# Patient Record
Sex: FEMALE | Race: WHITE | NOT HISPANIC OR LATINO | Employment: FULL TIME | URBAN - METROPOLITAN AREA
[De-identification: names, ages, dates, MRNs, and addresses within clinical notes are randomized per-mention and may not be internally consistent; named-entity substitution may affect disease eponyms.]

---

## 2023-08-20 PROBLEM — I49.3 PVC'S (PREMATURE VENTRICULAR CONTRACTIONS): Status: ACTIVE | Noted: 2023-08-20

## 2023-08-20 PROBLEM — R93.89 ABNORMAL MAGNETIC RESONANCE IMAGING STUDY: Status: ACTIVE | Noted: 2023-08-20

## 2023-08-20 PROBLEM — R55 SYNCOPE: Status: ACTIVE | Noted: 2023-08-20

## 2023-08-20 PROBLEM — R94.31 ABNORMAL EKG: Status: ACTIVE | Noted: 2023-08-20

## 2023-08-20 PROBLEM — E03.8 ADULT ONSET HYPOTHYROIDISM: Status: ACTIVE | Noted: 2023-08-20

## 2023-08-20 PROBLEM — R00.2 PALPITATIONS: Status: ACTIVE | Noted: 2023-08-20

## 2023-08-20 PROBLEM — R53.82 CHRONIC FATIGUE: Status: ACTIVE | Noted: 2023-08-20

## 2023-08-20 PROBLEM — M32.9 SYSTEMIC LUPUS ERYTHEMATOSUS (MULTI): Status: ACTIVE | Noted: 2023-08-20

## 2023-08-20 PROBLEM — I47.29 NSVT (NONSUSTAINED VENTRICULAR TACHYCARDIA) (MULTI): Status: ACTIVE | Noted: 2023-08-20

## 2023-08-20 PROBLEM — I45.10 COMPLETE RIGHT BUNDLE BRANCH BLOCK (RBBB): Status: ACTIVE | Noted: 2023-08-20

## 2023-08-20 RX ORDER — PARSLEY 450 MG
CAPSULE ORAL
COMMUNITY
Start: 2017-12-27

## 2023-08-20 RX ORDER — GLUCOSAM/CHONDRO/HERB 149/HYAL 750-100 MG
TABLET ORAL
COMMUNITY

## 2023-08-20 RX ORDER — THYROID 120 MG/1
1 TABLET ORAL DAILY
COMMUNITY

## 2023-08-20 RX ORDER — ONDANSETRON 4 MG/1
4 TABLET, ORALLY DISINTEGRATING ORAL
COMMUNITY
Start: 2022-05-22

## 2023-08-20 RX ORDER — SOTALOL HYDROCHLORIDE 80 MG/1
TABLET ORAL
COMMUNITY
Start: 2022-10-13

## 2023-08-20 RX ORDER — MELOXICAM 15 MG/1
15 TABLET ORAL ONCE AS NEEDED
COMMUNITY

## 2023-08-20 RX ORDER — MILK THISTLE 500 MG
CAPSULE ORAL
COMMUNITY

## 2023-08-20 RX ORDER — NALTREXONE HYDROCHLORIDE 50 MG/1
4.5 TABLET, FILM COATED ORAL DAILY
COMMUNITY

## 2023-08-20 RX ORDER — THYROID 30 MG/1
1 TABLET ORAL DAILY
COMMUNITY

## 2023-08-20 RX ORDER — ACETAMINOPHEN 500 MG
TABLET ORAL
COMMUNITY

## 2023-08-20 RX ORDER — LICORICE ROOT EXTRACT
POWDER (GRAM) MISCELLANEOUS
COMMUNITY

## 2023-08-20 RX ORDER — MULTIVITAMIN
TABLET ORAL
COMMUNITY
Start: 2017-09-26

## 2023-08-20 RX ORDER — METOPROLOL TARTRATE 50 MG/1
TABLET ORAL EVERY 12 HOURS
COMMUNITY
Start: 2022-05-20

## 2023-08-20 RX ORDER — LIDOCAINE 560 MG/1
PATCH PERCUTANEOUS; TOPICAL; TRANSDERMAL
COMMUNITY
Start: 2022-05-20

## 2023-08-20 RX ORDER — SOTALOL HYDROCHLORIDE 80 MG/1
40 TABLET ORAL 2 TIMES DAILY
COMMUNITY
Start: 2023-08-17

## 2023-10-01 PROBLEM — Z79.899 LONG-TERM USE OF HYDROXYCHLOROQUINE: Chronic | Status: ACTIVE | Noted: 2023-10-01

## 2023-10-01 NOTE — PROGRESS NOTES
Subjective   Patient ID: Destinee Talbot is a 38 y.o. female who presents for Lupus (Here for follow up lupus).  Pt feels awful.  Thinks it is more her cardiac issues.   She has scarring on her septal wall and may have sarcoidosis but they have been unable to determine for sure.  Pt had a lot of presyncope.   Now has loop recorder and no longer as any episodes of NS-SVT.  Pt with chest pain, dyspnea, fatigue.  No malar rash, has foot/ankle/hip pain but otherwise ok.   Doesn't think lupus is active.  Pt is able to be out in the sun without flare        Review of Systems   Constitutional:  Positive for fatigue. Negative for fever and unexpected weight change.   HENT:  Negative for congestion, mouth sores, postnasal drip, sore throat and tinnitus.         No dry eye and dry mouth   Eyes:  Negative for pain and redness.   Respiratory:  Negative for cough and shortness of breath.    Cardiovascular:  Positive for chest pain. Negative for palpitations and leg swelling.   Gastrointestinal:  Negative for abdominal pain, constipation, diarrhea, nausea and vomiting.   Endocrine: Negative for polyuria.   Genitourinary:  Negative for dysuria, flank pain and hematuria.   Musculoskeletal:  Positive for arthralgias. Negative for back pain and myalgias.   Skin:  Negative for color change.   Neurological:  Negative for dizziness, weakness, numbness and headaches.   Hematological:  Does not bruise/bleed easily.   Psychiatric/Behavioral:  Negative for sleep disturbance. The patient is not nervous/anxious.        Objective   Physical Exam  Vitals reviewed.   Constitutional:       General: She is not in acute distress.     Appearance: Normal appearance.   HENT:      Head: Normocephalic and atraumatic.      Nose: Nose normal.   Eyes:      Pupils: Pupils are equal, round, and reactive to light.   Cardiovascular:      Rate and Rhythm: Normal rate and regular rhythm.      Pulses: Normal pulses.      Heart sounds: No murmur heard.     No friction  rub. No gallop.   Pulmonary:      Breath sounds: Normal breath sounds. No wheezing or rales.   Musculoskeletal:         General: No swelling, tenderness or deformity.      Cervical back: Normal range of motion.      Right lower leg: No edema.      Left lower leg: No edema.   Skin:     Findings: No erythema or rash.   Neurological:      General: No focal deficit present.      Mental Status: She is alert.   Psychiatric:         Mood and Affect: Mood normal.         Assessment/Plan   Problem List Items Addressed This Visit             ICD-10-CM    NSVT (nonsustained ventricular tachycardia) (CMS/[facility]) I47.29    Systemic lupus erythematosus (CMS/[facility]) - Primary M32.9     Lupus but has stopped plaquenil therapy.   Pt stable and appears most of her symptoms may be due to her possible cardiac sarcoid.   Will check lab to further evaluate.  Vitamin D level ordered         Relevant Orders    Anti-DNA Antibody, Double-Stranded    C3 Complement    C4 Complement    CBC and Auto Differential    Comprehensive Metabolic Panel    C-Reactive Protein    Sedimentation Rate    Follow Up In Rheumatology    Vitamin D 25-Hydroxy,Total (for eval of Vitamin D levels)    Long-term use of hydroxychloroquine (Chronic) Z79.899    Cardiac sarcoidosis D86.85    Relevant Orders    Vitamin D 25-Hydroxy,Total (for eval of Vitamin D levels)

## 2023-10-02 ENCOUNTER — OFFICE VISIT (OUTPATIENT)
Dept: RHEUMATOLOGY | Facility: CLINIC | Age: 38
End: 2023-10-02
Payer: COMMERCIAL

## 2023-10-02 VITALS
HEART RATE: 87 BPM | HEIGHT: 65 IN | SYSTOLIC BLOOD PRESSURE: 115 MMHG | WEIGHT: 153.1 LBS | TEMPERATURE: 97.2 F | DIASTOLIC BLOOD PRESSURE: 83 MMHG | BODY MASS INDEX: 25.51 KG/M2

## 2023-10-02 DIAGNOSIS — M32.19 SYSTEMIC LUPUS ERYTHEMATOSUS WITH OTHER ORGAN INVOLVEMENT, UNSPECIFIED SLE TYPE (MULTI): Primary | ICD-10-CM

## 2023-10-02 DIAGNOSIS — I47.29 NSVT (NONSUSTAINED VENTRICULAR TACHYCARDIA) (MULTI): ICD-10-CM

## 2023-10-02 DIAGNOSIS — Z79.899 LONG-TERM USE OF HYDROXYCHLOROQUINE: Chronic | ICD-10-CM

## 2023-10-02 DIAGNOSIS — D86.85 CARDIAC SARCOIDOSIS (HHS-HCC): ICD-10-CM

## 2023-10-02 LAB
NON-UH HIE A/G RATIO: 1.4
NON-UH HIE ALB: 4.2 G/DL (ref 3.4–5)
NON-UH HIE ALK PHOS: 50 UNIT/L (ref 46–116)
NON-UH HIE BASO COUNT: 0.03 X1000 (ref 0–0.2)
NON-UH HIE BASOS %: 0.4 %
NON-UH HIE BILIRUBIN, TOTAL: 0.7 MG/DL (ref 0.2–1)
NON-UH HIE BUN/CREAT RATIO: 13.3
NON-UH HIE BUN: 12 MG/DL (ref 9–23)
NON-UH HIE C-REACTIVE PROTEIN, QUANTITATIVE: <0.4 MG/DL (ref 0–0.9)
NON-UH HIE CALCIUM: 9.6 MG/DL (ref 8.7–10.4)
NON-UH HIE CALCULATED OSMOLALITY: 283 MOSM/KG (ref 275–295)
NON-UH HIE CHLORIDE: 106 MMOL/L (ref 98–107)
NON-UH HIE CO2, VENOUS: 30 MMOL/L (ref 20–31)
NON-UH HIE CREATININE: 0.9 MG/DL (ref 0.5–0.8)
NON-UH HIE DIFF?: NO
NON-UH HIE EOS COUNT: 0.06 X1000 (ref 0–0.5)
NON-UH HIE EOSIN %: 0.9 %
NON-UH HIE GFR AA: >60
NON-UH HIE GLOBULIN: 2.9 G/DL
NON-UH HIE GLOMERULAR FILTRATION RATE: >60 ML/MIN/1.73M?
NON-UH HIE GLUCOSE: 98 MG/DL (ref 74–106)
NON-UH HIE GOT: 19 UNIT/L (ref 15–37)
NON-UH HIE GPT: 12 UNIT/L (ref 10–49)
NON-UH HIE HCT: 38.5 % (ref 36–46)
NON-UH HIE HGB: 13.3 G/DL (ref 12–16)
NON-UH HIE INSTR WBC: 6.3
NON-UH HIE K: 5.2 MMOL/L (ref 3.5–5.1)
NON-UH HIE LYMPH %: 26.5 %
NON-UH HIE LYMPH COUNT: 1.67 X1000 (ref 1.2–4.8)
NON-UH HIE MCH: 33.1 PG (ref 27–34)
NON-UH HIE MCHC: 34.6 G/DL (ref 32–37)
NON-UH HIE MCV: 95.5 FL (ref 80–100)
NON-UH HIE MONO %: 7.7 %
NON-UH HIE MONO COUNT: 0.48 X1000 (ref 0.1–1)
NON-UH HIE MPV: 7.8 FL (ref 7.4–10.4)
NON-UH HIE NA: 142 MMOL/L (ref 135–145)
NON-UH HIE NEUTROPHIL %: 64.5 %
NON-UH HIE NEUTROPHIL COUNT (ANC): 4.06 X1000 (ref 1.4–8.8)
NON-UH HIE NUCLEATED RBC: 0 /100WBC
NON-UH HIE PLATELET: 300 X10 (ref 150–450)
NON-UH HIE RBC: 4.03 X10 (ref 4.2–5.4)
NON-UH HIE RDW: 11.9 % (ref 11.5–14.5)
NON-UH HIE SED RATE WESTERGREN: 12 MM/HR (ref 0–20)
NON-UH HIE TOTAL PROTEIN: 7.1 G/DL (ref 5.7–8.2)
NON-UH HIE VIT D 25: 37 NG/ML
NON-UH HIE WBC: 6.3 X10 (ref 4.5–11)

## 2023-10-02 PROCEDURE — 1036F TOBACCO NON-USER: CPT | Performed by: INTERNAL MEDICINE

## 2023-10-02 PROCEDURE — 99214 OFFICE O/P EST MOD 30 MIN: CPT | Performed by: INTERNAL MEDICINE

## 2023-10-02 ASSESSMENT — ENCOUNTER SYMPTOMS
NUMBNESS: 0
ABDOMINAL PAIN: 0
EYE REDNESS: 0
EYE PAIN: 0
DIARRHEA: 0
SORE THROAT: 0
HEADACHES: 0
SLEEP DISTURBANCE: 0
BACK PAIN: 0
COUGH: 0
FEVER: 0
UNEXPECTED WEIGHT CHANGE: 0
DYSURIA: 0
DIZZINESS: 0
WEAKNESS: 0
PALPITATIONS: 0
COLOR CHANGE: 0
FATIGUE: 1
MYALGIAS: 0
CONSTIPATION: 0
VOMITING: 0
HEMATURIA: 0
DEPRESSION: 0
NERVOUS/ANXIOUS: 0
FLANK PAIN: 0
NAUSEA: 0
BRUISES/BLEEDS EASILY: 0
SHORTNESS OF BREATH: 0
ARTHRALGIAS: 1

## 2023-10-02 ASSESSMENT — PATIENT HEALTH QUESTIONNAIRE - PHQ9
10. IF YOU CHECKED OFF ANY PROBLEMS, HOW DIFFICULT HAVE THESE PROBLEMS MADE IT FOR YOU TO DO YOUR WORK, TAKE CARE OF THINGS AT HOME, OR GET ALONG WITH OTHER PEOPLE: SOMEWHAT DIFFICULT
1. LITTLE INTEREST OR PLEASURE IN DOING THINGS: NOT AT ALL
SUM OF ALL RESPONSES TO PHQ9 QUESTIONS 1 AND 2: 1
2. FEELING DOWN, DEPRESSED OR HOPELESS: SEVERAL DAYS

## 2023-10-02 ASSESSMENT — PAIN SCALES - GENERAL: PAINLEVEL: 2

## 2023-10-02 NOTE — ASSESSMENT & PLAN NOTE
Lupus but has stopped plaquenil therapy.   Pt stable and appears most of her symptoms may be due to her possible cardiac sarcoid.   Will check lab to further evaluate.  Vitamin D level ordered

## 2023-10-03 ENCOUNTER — ANCILLARY PROCEDURE (OUTPATIENT)
Dept: RADIOLOGY | Facility: CLINIC | Age: 38
End: 2023-10-03
Payer: COMMERCIAL

## 2023-10-03 ENCOUNTER — OFFICE VISIT (OUTPATIENT)
Dept: ORTHOPEDIC SURGERY | Facility: CLINIC | Age: 38
End: 2023-10-03
Payer: COMMERCIAL

## 2023-10-03 DIAGNOSIS — M79.641 RIGHT HAND PAIN: ICD-10-CM

## 2023-10-03 DIAGNOSIS — S60.221A CONTUSION OF RIGHT HAND, INITIAL ENCOUNTER: Primary | ICD-10-CM

## 2023-10-03 LAB — NON-UH HIE ANTI-DNA: NEGATIVE

## 2023-10-03 PROCEDURE — 99203 OFFICE O/P NEW LOW 30 MIN: CPT | Performed by: FAMILY MEDICINE

## 2023-10-03 PROCEDURE — 1036F TOBACCO NON-USER: CPT | Performed by: FAMILY MEDICINE

## 2023-10-03 PROCEDURE — 73130 X-RAY EXAM OF HAND: CPT | Mod: RT,FY

## 2023-10-03 PROCEDURE — 73130 X-RAY EXAM OF HAND: CPT | Mod: RIGHT SIDE | Performed by: RADIOLOGY

## 2023-10-03 NOTE — RESULT ENCOUNTER NOTE
Pt sent message on [employer]  Your TAZ panel is still pending but the rest of your labs are stable except for a slightly high potassium.  Watch your intake of potassium rich foods (banana, cantaloupe, etc)   our Vitamin D is normal

## 2023-10-03 NOTE — PROGRESS NOTES
History of Present Illness   Chief Complaint   Patient presents with    Right Hand - Pain       The patient is 38 y.o. right-hand-dominant female presenting for evaluation of right hand pain.  Acute onset of symptoms yesterday, says she was playing with her 90 pound yellow lab, does not recall exact mechanism of injury but noticed some acute pain over the dorsum of her hand along the third metacarpal region.  Symptoms have been ongoing, she has some pain with extension of her finger specifically in the third digit.  She admits to some tingling in her third finger as well.  There is some swelling.  She has been icing, did take some ibuprofen yesterday and this morning.  With some improvement.    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    Encounter for general adult medical examination without abnormal findings     Encounter for annual health examination    Encounter for immunization     Encounter for immunization    Encounter for therapeutic drug level monitoring 10/13/2022    Encounter for monitoring of hydroxychloroquine therapy    Other specified soft tissue disorders 08/09/2021    Calf swelling    Personal history of other infectious and parasitic diseases 10/29/2021    History of Harsh-Barr virus infection    Personal history of other medical treatment     History of cardiac monitoring    Personal history of other medical treatment     History of echocardiogram       Medication Documentation Review Audit       Reviewed by Kerri Junior MD (Physician) on 10/02/23 at 0928      Medication Order Taking? Sig Documenting Provider Last Dose Status   ashwagandha root extract 500 mg capsule [id number] No Take by mouth. Historical Provider, MD Not Taking Active   Bacillus coagulans/inulin (PROBIOTIC FORMULA, INULIN, ORAL) [id number] No Take by mouth. Historical Provider, MD Not Taking  Active   cholecalciferol (Vitamin D-3) 5,000 Units tablet [id number] Yes Take by mouth. Douglas Mccarty MD Taking Active   L. acidophilus/Bifid. animalis 32 billion cell capsule [id number] No Take by mouth. Douglas Mccarty MD Not Taking Active   licorice root extract, bulk, powder [id number] No  Douglas Mccarty MD Not Taking Active   lidocaine 4 % patch [id number] No Apply topically. Douglas Mccarty MD Not Taking Active   meloxicam (Mobic) 15 mg tablet [id number] No Take 1 tablet (15 mg) by mouth 1 time if needed. Douglas Mccarty MD Not Taking Active   metoprolol tartrate (Lopressor) 50 mg tablet [id number] No Take by mouth every 12 hours. Douglas Mccarty MD Not Taking Active   milk thistle 500 mg capsule [id number] No Take by mouth. Douglas Mccarty MD Not Taking Active   multivitamin tablet [id number] Yes Take by mouth. Douglas Mccarty MD Taking Active   naltrexone (Depade) 50 mg tablet [id number] Yes Take 4.5 mg by mouth once daily. Douglas Mccarty MD Taking Differently Active   omega 3-dha-epa-fish oil (Fish OiL) 1,000 mg (120 mg-180 mg) capsule [id number] No Take by mouth. Douglas Mccarty MD Not Taking Active   ondansetron ODT (Zofran-ODT) 4 mg disintegrating tablet [id number] No Take 1 tablet (4 mg) by mouth. Douglas Mccarty MD Not Taking Active   rivaroxaban (Xarelto) 15 mg tablet [id number] No Take by mouth. Douglas Mccarty MD Not Taking Active   sotalol (Betapace) 80 mg tablet [id number] Yes Take 0.5 tablets (40 mg) by mouth twice a day. Douglas Mccarty MD Taking Active   sotalol (Betapace) 80 mg tablet [id number] No Take by mouth. Douglas Mccarty MD Not Taking Active   thyroid, pork, ([place] Thyroid) 120 mg tablet [id number] No Take 1 tablet (120 mg) by mouth once daily. Douglas Mccarty MD Not Taking Active   thyroid, pork, ([place] Thyroid) 30 mg tablet [id number] No Take 1 tablet (30 mg) by mouth once daily. Douglas Mccarty MD Not Taking Active                     Allergies   Allergen Reactions    Ketorolac Hives    Latex Other    Latex, Natural Rubber Unknown    Oxycodone Hives    Oxycodone-Acetaminophen Hives    Sulfa (Sulfonamide Antibiotics) Hives       Social History     Socioeconomic History    Marital status:      Spouse name: Not on file    Number of children: Not on file    Years of education: Not on file    Highest education level: Not on file   Occupational History    Not on file   Tobacco Use    Smoking status: Never    Smokeless tobacco: Never   Substance and Sexual Activity    Alcohol use: Yes    Drug use: Never    Sexual activity: Not on file   Other Topics Concern    Not on file   Social History Narrative    Not on file     Social Determinants of Health     Financial Resource Strain: Not on file   Food Insecurity: Not on file   Transportation Needs: Not on file   Physical Activity: Not on file   Stress: Not on file   Social Connections: Not on file   Intimate Partner Violence: Not on file   Housing Stability: Not on file       Past Surgical History:   Procedure Laterality Date    DILATION AND CURETTAGE OF UTERUS  12/27/2017    Dilation And Curettage Of Cervical Stump    SEPTOPLASTY  12/27/2017    Septoplasty          Review of Systems   GENERAL: Negative  GI: Negative  MUSCULOSKELETAL: See HPI  SKIN: Negative  NEURO:  Negative     Physical Exam:    General/Constitutional: well appearing, no distress, appears stated age  HEENT: sclera clear  Respiratory: non labored breathing  Vascular: No edema, swelling or tenderness, except as noted in detailed exam.  Integumentary: No impressive skin lesions present, except as noted in detailed exam.  Neurological:  Alert and oriented   Psychological:  Normal mood and affect.  Musculoskeletal: Normal, except as noted in detailed exam and in HPI    Right hand: There is a small area of some mild soft tissue swelling over the dorsum of the hand along the base of the third metacarpal, she is focally tender to  palpation here, no other areas of tenderness to palpation nontender at the wrist.  She has good mobility of the wrist in all directions without pain.  She can actively extend all of her digits, she admits to discomfort with active third finger extension, there is some mild weakness with third finger extension against resistance, 4-5.  Sensation intact to light touch, 2+ pedal pulses       Imaging  xrays of right hand obtained today and independently reviewed, no fractures are identified, no other acute abnormalities are seen, no significant degenerative changes     Assessment   1. Contusion of right hand, initial encounter        2. Right hand pain  XR hand right 3+ views            Plan  Right hand pain, x-rays negative for fracture, history, exam findings consistent with hand contusion, recommending conservative management, she can ice, use over-the-counter medications as needed for pain, she can use right upper extremity as tolerated by symptoms, anticipate improvement in symptoms over the next 1 to 2 weeks.  She will follow-up as needed.

## 2023-10-03 NOTE — LETTER
October 3, 2023     Karla Dorsey MD  [address]  [facility]  [facility] [address]    Patient: Destinee Talbot   YOB: 1985   Date of Visit: 10/3/2023       Dear Dr. Karla Dorsey MD:    Thank you for referring Destinee Talbot to me for evaluation. Below are my notes for this consultation.  If you have questions, please do not hesitate to call me. I look forward to following your patient along with you.       Sincerely,     Cristopher Cevallos MD      CC: No Recipients  ______________________________________________________________________________________      History of Present Illness   Chief Complaint   Patient presents with   • Right Hand - Pain       The patient is 38 y.o. right-hand-dominant female presenting for evaluation of right hand pain.  Acute onset of symptoms yesterday, says she was playing with her 90 pound yellow lab, does not recall exact mechanism of injury but noticed some acute pain over the dorsum of her hand along the third metacarpal region.  Symptoms have been ongoing, she has some pain with extension of her finger specifically in the third digit.  She admits to some tingling in her third finger as well.  There is some swelling.  She has been icing, did take some ibuprofen yesterday and this morning.  With some improvement.    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   • Encounter for general adult medical examination without abnormal findings     Encounter for annual health examination   • Encounter for immunization     Encounter for immunization   • Encounter for therapeutic drug level monitoring 10/13/2022    Encounter for monitoring of hydroxychloroquine therapy   • Other specified soft tissue disorders 08/09/2021    Calf swelling   • Personal history of other infectious and parasitic diseases 10/29/2021    History of Harsh-Barr  virus infection   • Personal history of other medical treatment     History of cardiac monitoring   • Personal history of other medical treatment     History of echocardiogram       Medication Documentation Review Audit       Reviewed by Kerri Junior MD (Physician) on 10/02/23 at 0928      Medication Order Taking? Sig Documenting Provider Last Dose Status   ashwagandha root extract 500 mg capsule [id number] No Take by mouth. Douglas Mccarty MD Not Taking Active   Bacillus coagulans/inulin (PROBIOTIC FORMULA, INULIN, ORAL) [id number] No Take by mouth. Douglas Mccarty MD Not Taking Active   cholecalciferol (Vitamin D-3) 5,000 Units tablet [id number] Yes Take by mouth. Douglas Mccarty MD Taking Active   L. acidophilus/Bifid. animalis 32 billion cell capsule [id number] No Take by mouth. Douglas Mccarty MD Not Taking Active   licorice root extract, bulk, powder [id number] No  Douglas Mccarty MD Not Taking Active   lidocaine 4 % patch [id number] No Apply topically. Douglas Mccarty MD Not Taking Active   meloxicam (Mobic) 15 mg tablet [id number] No Take 1 tablet (15 mg) by mouth 1 time if needed. Douglas Mccarty MD Not Taking Active   metoprolol tartrate (Lopressor) 50 mg tablet [id number] No Take by mouth every 12 hours. Douglas Mccarty MD Not Taking Active   milk thistle 500 mg capsule [id number] No Take by mouth. Douglas Mccarty MD Not Taking Active   multivitamin tablet [id number] Yes Take by mouth. Douglas Mccarty MD Taking Active   naltrexone (Depade) 50 mg tablet [id number] Yes Take 4.5 mg by mouth once daily. Douglas Mccarty MD Taking Differently Active   omega 3-dha-epa-fish oil (Fish OiL) 1,000 mg (120 mg-180 mg) capsule [id number] No Take by mouth. Douglas Mccarty MD Not Taking Active   ondansetron ODT (Zofran-ODT) 4 mg disintegrating tablet [id number] No Take 1 tablet (4 mg) by mouth. Douglas Mccarty MD Not Taking Active   rivaroxaban (Xarelto) 15 mg tablet  [id number] No Take by mouth. Historical Provider, MD Not Taking Active   sotalol (Betapace) 80 mg tablet [id number] Yes Take 0.5 tablets (40 mg) by mouth twice a day. Historical Provider, MD Taking Active   sotalol (Betapace) 80 mg tablet [id number] No Take by mouth. Historical Provider, MD Not Taking Active   thyroid, pork, ([place] Thyroid) 120 mg tablet [id number] No Take 1 tablet (120 mg) by mouth once daily. Historical Provider, MD Not Taking Active   thyroid, pork, ([place] Thyroid) 30 mg tablet [id number] No Take 1 tablet (30 mg) by mouth once daily. Historical Provider, MD Not Taking Active                    Allergies   Allergen Reactions   • Ketorolac Hives   • Latex Other   • Latex, Natural Rubber Unknown   • Oxycodone Hives   • Oxycodone-Acetaminophen Hives   • Sulfa (Sulfonamide Antibiotics) Hives       Social History     Socioeconomic History   • Marital status:      Spouse name: Not on file   • Number of children: Not on file   • Years of education: Not on file   • Highest education level: Not on file   Occupational History   • Not on file   Tobacco Use   • Smoking status: Never   • Smokeless tobacco: Never   Substance and Sexual Activity   • Alcohol use: Yes   • Drug use: Never   • Sexual activity: Not on file   Other Topics Concern   • Not on file   Social History Narrative   • Not on file     Social Determinants of Health     Financial Resource Strain: Not on file   Food Insecurity: Not on file   Transportation Needs: Not on file   Physical Activity: Not on file   Stress: Not on file   Social Connections: Not on file   Intimate Partner Violence: Not on file   Housing Stability: Not on file       Past Surgical History:   Procedure Laterality Date   • DILATION AND CURETTAGE OF UTERUS  12/27/2017    Dilation And Curettage Of Cervical Stump   • SEPTOPLASTY  12/27/2017    Septoplasty          Review of Systems   GENERAL: Negative  GI: Negative  MUSCULOSKELETAL: See HPI  SKIN: Negative  NEURO:   Negative     Physical Exam:    General/Constitutional: well appearing, no distress, appears stated age  HEENT: sclera clear  Respiratory: non labored breathing  Vascular: No edema, swelling or tenderness, except as noted in detailed exam.  Integumentary: No impressive skin lesions present, except as noted in detailed exam.  Neurological:  Alert and oriented   Psychological:  Normal mood and affect.  Musculoskeletal: Normal, except as noted in detailed exam and in HPI    Right hand: There is a small area of some mild soft tissue swelling over the dorsum of the hand along the base of the third metacarpal, she is focally tender to palpation here, no other areas of tenderness to palpation nontender at the wrist.  She has good mobility of the wrist in all directions without pain.  She can actively extend all of her digits, she admits to discomfort with active third finger extension, there is some mild weakness with third finger extension against resistance, 4-5.  Sensation intact to light touch, 2+ pedal pulses       Imaging  xrays of right hand obtained today and independently reviewed, no fractures are identified, no other acute abnormalities are seen, no significant degenerative changes     Assessment   1. Contusion of right hand, initial encounter        2. Right hand pain  XR hand right 3+ views            Plan  Right hand pain, x-rays negative for fracture, history, exam findings consistent with hand contusion, recommending conservative management, she can ice, use over-the-counter medications as needed for pain, she can use right upper extremity as tolerated by symptoms, anticipate improvement in symptoms over the next 1 to 2 weeks.  She will follow-up as needed.

## 2023-10-04 LAB
NON-UH HIE COMPLEMENT C3: 121 MG/DL (ref 90–180)
NON-UH HIE COMPLEMENT C4: 38 MG/DL (ref 10–40)

## 2024-05-25 ENCOUNTER — HOSPITAL ENCOUNTER (EMERGENCY)
Facility: HOSPITAL | Age: 39
Discharge: HOME | End: 2024-05-25
Attending: EMERGENCY MEDICINE
Payer: COMMERCIAL

## 2024-05-25 VITALS
DIASTOLIC BLOOD PRESSURE: 73 MMHG | HEIGHT: 65 IN | BODY MASS INDEX: 27.16 KG/M2 | TEMPERATURE: 98.1 F | OXYGEN SATURATION: 100 % | SYSTOLIC BLOOD PRESSURE: 110 MMHG | HEART RATE: 100 BPM | WEIGHT: 163 LBS | RESPIRATION RATE: 16 BRPM

## 2024-05-25 DIAGNOSIS — S61.219A FINGER LACERATION, INITIAL ENCOUNTER: Primary | ICD-10-CM

## 2024-05-25 PROCEDURE — 2500000004 HC RX 250 GENERAL PHARMACY W/ HCPCS (ALT 636 FOR OP/ED): Performed by: STUDENT IN AN ORGANIZED HEALTH CARE EDUCATION/TRAINING PROGRAM

## 2024-05-25 PROCEDURE — 12001 RPR S/N/AX/GEN/TRNK 2.5CM/<: CPT | Performed by: STUDENT IN AN ORGANIZED HEALTH CARE EDUCATION/TRAINING PROGRAM

## 2024-05-25 PROCEDURE — 90471 IMMUNIZATION ADMIN: CPT | Performed by: STUDENT IN AN ORGANIZED HEALTH CARE EDUCATION/TRAINING PROGRAM

## 2024-05-25 PROCEDURE — 99283 EMERGENCY DEPT VISIT LOW MDM: CPT

## 2024-05-25 PROCEDURE — 2500000005 HC RX 250 GENERAL PHARMACY W/O HCPCS: Performed by: STUDENT IN AN ORGANIZED HEALTH CARE EDUCATION/TRAINING PROGRAM

## 2024-05-25 PROCEDURE — 90715 TDAP VACCINE 7 YRS/> IM: CPT | Performed by: STUDENT IN AN ORGANIZED HEALTH CARE EDUCATION/TRAINING PROGRAM

## 2024-05-25 RX ORDER — LIDOCAINE HYDROCHLORIDE AND EPINEPHRINE 10; 10 MG/ML; UG/ML
5 INJECTION, SOLUTION INFILTRATION; PERINEURAL ONCE
Status: COMPLETED | OUTPATIENT
Start: 2024-05-25 | End: 2024-05-25

## 2024-05-25 RX ADMIN — LIDOCAINE HYDROCHLORIDE,EPINEPHRINE BITARTRATE 5 ML: 10; .01 INJECTION, SOLUTION INFILTRATION; PERINEURAL at 15:30

## 2024-05-25 RX ADMIN — TETANUS TOXOID, REDUCED DIPHTHERIA TOXOID AND ACELLULAR PERTUSSIS VACCINE, ADSORBED 0.5 ML: 5; 2.5; 8; 8; 2.5 SUSPENSION INTRAMUSCULAR at 16:12

## 2024-05-25 ASSESSMENT — LIFESTYLE VARIABLES
HAVE YOU EVER FELT YOU SHOULD CUT DOWN ON YOUR DRINKING: NO
EVER HAD A DRINK FIRST THING IN THE MORNING TO STEADY YOUR NERVES TO GET RID OF A HANGOVER: NO
HAVE PEOPLE ANNOYED YOU BY CRITICIZING YOUR DRINKING: NO
TOTAL SCORE: 0
EVER FELT BAD OR GUILTY ABOUT YOUR DRINKING: NO

## 2024-05-25 ASSESSMENT — PAIN DESCRIPTION - ORIENTATION: ORIENTATION: RIGHT

## 2024-05-25 ASSESSMENT — COLUMBIA-SUICIDE SEVERITY RATING SCALE - C-SSRS
1. IN THE PAST MONTH, HAVE YOU WISHED YOU WERE DEAD OR WISHED YOU COULD GO TO SLEEP AND NOT WAKE UP?: NO
2. HAVE YOU ACTUALLY HAD ANY THOUGHTS OF KILLING YOURSELF?: NO
6. HAVE YOU EVER DONE ANYTHING, STARTED TO DO ANYTHING, OR PREPARED TO DO ANYTHING TO END YOUR LIFE?: NO
5. HAVE YOU STARTED TO WORK OUT OR WORKED OUT THE DETAILS OF HOW TO KILL YOURSELF? DO YOU INTEND TO CARRY OUT THIS PLAN?: NO
4. HAVE YOU HAD THESE THOUGHTS AND HAD SOME INTENTION OF ACTING ON THEM?: NO

## 2024-05-25 ASSESSMENT — PAIN - FUNCTIONAL ASSESSMENT: PAIN_FUNCTIONAL_ASSESSMENT: 0-10

## 2024-05-25 ASSESSMENT — PAIN SCALES - GENERAL: PAINLEVEL_OUTOF10: 2

## 2024-05-25 NOTE — DISCHARGE INSTRUCTIONS
Please keep sutures dry and covered for 24 hours.  In 24 hours use gentle soap and water daily to your laceration and apply over-the-counter bacitracin antibiotic ointment once a day.  Do not submerge in water until the sutures have been removed (i.e. baths, swimming), but it is OK to shower.  You should also leave the splint on your finger for the next 5 to 7 days just to avoid any pressure on the laceration and stitches themselves.  If you develop redness, red streaking, fever, chills, or swelling, please return here immediately.  Please have your sutures removed in 7-10 days.  Suture removal likely can be performed by your doctor or at an urgent care clinic.  You can always return to the ER as well (there may be associated charges wherever you go to have your sutures removed, including the ER).

## 2024-05-25 NOTE — PROGRESS NOTES
The patient was seen by the midlevel/resident.  I have personally saw the patient and made/approved the management plan and take responsibility for the patient management.  I reviewed the EKG's (when done) and agree with the interpretation.  I have seen and examined the patient; agree with the workup, evaluation, MDM, and diagnosis.  The care plan has been discussed with the midlevel/resident; I have reviewed the note and agree with the documented findings.     Patient presents with laceration to her right thumb.  By mistake she was using a knife with her left hand and cut her dorsal surface of her right thumb.  There is over the PIP joint.  Will be repaired by the resident and I supervised.  Patient stable be discharged home tetanus was updated.  No obvious tendon injury.  Diagnoses as of 05/25/24 1610   Finger laceration, initial encounter     Allan Padilla MD

## 2024-05-25 NOTE — ED PROVIDER NOTES
CC: Laceration (Right thumb (knuckle)  with kitchen knife. Up to date on tetanus. No thinners, bleeding controlled. )     HPI:  Destinee Talbot is a 39 y.o. female with no significant past medical history presenting to the emergency department today due to concerns for laceration of the right thumb.  Patient states that she was cooking lunch.  She cut her thumb with the kitchen knife.  She states that the kitchen knife was clean.  She states that her last tetanus shot was 7 to 8 years ago.  She is left-handed and the lacerations over her right thumb.  She denies any fevers, chills, nausea, vomiting, or other symptoms at this time.  Denies injuries to any other part of her body.    Limitations to History: none  Additional History provided by: N/A    External Records Reviewed:  Recent available ED and inpatient notes reviewed in EMR.    PMHx/PSHx:  Per HPI.   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    - has a past surgical history that includes Dilation and curettage of uterus (12/27/2017) and Septoplasty (12/27/2017).    Medications:  Reviewed in EMR. See EMR for complete list of medications and doses.    Allergies:  Ketorolac; Latex; Latex, natural rubber; Oxycodone; Oxycodone-acetaminophen; and Sulfa (sulfonamide antibiotics)    Social History:  - Tobacco:  reports that she has never smoked. She has never used smokeless tobacco.   - Alcohol:  reports current alcohol use.   - Illicit Drugs:  reports no history of drug use.     ROS:  Per HPI.     ???????????????????????????????????????????????????????????????  Triage Vitals:  T 36.7 °C (98.1 °F)    /81   RR 16  O2 100 % None (Room air)    Physical Exam  Vitals and nursing note reviewed.   Constitutional:       Appearance: Normal appearance.   HENT:      Head: Normocephalic.      Mouth/Throat:      Mouth: Mucous membranes are moist.   Eyes:      Conjunctiva/sclera: Conjunctivae normal.   Cardiovascular:      Rate and Rhythm: Normal rate.   Pulmonary:      Effort: Pulmonary effort is normal.   Abdominal:      General: Abdomen is flat.   Musculoskeletal:      Comments: 2 cm laceration over the extensor side of the right thumb overlying the interphalangeal joint.  No significant obstruction into the joint space, full range of motion, neurovascularly intact   Neurological:      Mental Status: She is alert and oriented to person, place, and time.   Psychiatric:         Mood and Affect: Mood normal.       ???????????????????????????????????????????????????????????????  ED Course:  Diagnoses as of 05/25/24 1609   Finger laceration, initial encounter       EKG & Images:  Independently reviewed, See ED Course      MDM:  -The patient is a 39-year-old woman presenting to the emergency department today due to concerns for laceration over her right thumb.  Tetanus was updated here.  Given the laceration overlying the interphalangeal joint, do feel that would best heal with stitches and a finger splint.  As such, digital block was done into 4-0 Prolene stitches were placed.  Patient was instructed to follow-up up at any PCP, urgent care, or emergency room to have the stitches removed in 7 to 10 days.  Return precautions were provided.  She was instructed to return to the ED should she develop any fevers, chills, redness, worsening swelling, worsening pain, purulent drainage or other signs of infection.  Patient is understanding of this and amenable to this plan.    Final diagnoses:   None         Social Determinants Limiting Care:  None identified    Disposition:  Discharge    Barbara Moreira MD   Emergency Medicine Resident,  PGY3  [facility]     Disclaimer: This note was dictated by speech recognition. Minor errors in transcription may be present    Laceration Repair    Performed by: Barbara Moreira MD  Authorized by: Allan Padilla MD    Consent:     Consent obtained:  Verbal    Consent given by:  Patient    Risks discussed:  Infection, pain, need for additional repair, poor cosmetic result and nerve damage    Alternatives discussed:  No treatment  Laceration details:     Location:  Finger    Finger location:  R thumb    Length (cm):  2  Exploration:     Imaging outcome: foreign body not noted      Wound exploration: entire depth of wound visualized      Wound extent: no areolar tissue violation noted, no fascia violation noted, no foreign bodies/material noted, no muscle damage noted, no nerve damage noted and no tendon damage noted      Contaminated: no    Treatment:     Irrigation solution:  Sterile water    Irrigation volume:  200    Irrigation method:  Syringe  Skin repair:     Repair method:  Sutures    Suture size:  4-0    Suture material:  Prolene    Suture technique:  Simple interrupted    Number of sutures:  2  Repair type:     Repair type:  Simple  Post-procedure details:     Dressing:  Splint for protection and non-adherent dressing    Procedure completion:  Tolerated   ? [employer] last updated 5/25/2024 4:01 PM        Barbara Moreira MD  Resident  05/25/24 [id number]

## 2024-05-25 NOTE — ED TRIAGE NOTES
Patient here for laceration Right thumb (knuckle)  with kitchen knife. Up to date on tetanus. No thinners, bleeding controlled.